# Patient Record
Sex: MALE | Race: WHITE | Employment: FULL TIME | URBAN - METROPOLITAN AREA
[De-identification: names, ages, dates, MRNs, and addresses within clinical notes are randomized per-mention and may not be internally consistent; named-entity substitution may affect disease eponyms.]

---

## 2019-12-17 ENCOUNTER — HOSPITAL ENCOUNTER (OUTPATIENT)
Dept: ULTRASOUND IMAGING | Age: 24
Discharge: HOME | End: 2019-12-17
Attending: FAMILY MEDICINE
Payer: COMMERCIAL

## 2019-12-17 DIAGNOSIS — N50.89 OTHER SPECIFIED DISORDERS OF THE MALE GENITAL ORGANS: ICD-10-CM

## 2019-12-17 PROCEDURE — 93975 VASCULAR STUDY: CPT

## 2021-07-19 ENCOUNTER — HOSPITAL ENCOUNTER (OUTPATIENT)
Dept: PHYSICAL THERAPY | Age: 26
Discharge: HOME | End: 2021-07-19
Payer: COMMERCIAL

## 2021-07-19 PROCEDURE — 97161 PT EVAL LOW COMPLEX 20 MIN: CPT

## 2021-07-19 PROCEDURE — 97110 THERAPEUTIC EXERCISES: CPT

## 2021-07-19 NOTE — PROGRESS NOTES
PT DAILY TREATMENT NOTE - [facility]     Patient Name: Bairon Brewster  Date:2021  : 1995  [x]  Patient  Verified  Payor: BLUE CROSS / Plan: Sime[facility] [id number] PPO / Product Type: PPO /    In time:345  Out time:430  Total Treatment Time (min): 45  Visit #: 1     Treatment Area: Left knee pain [M25.562]    SUBJECTIVE  Pain Level (0-10 scale): 1/10  Any medication changes, allergies to medications, adverse drug reactions, diagnosis change, or new procedure performed?: [x] No    [] Yes (see summary sheet for update)  Subjective functional status/changes:   [] No changes reported      MRI: 21  MEDIAL COMPARTMENT:     > Meniscus: Intact meniscus.     > Articular Cartilage: Cartilage remains intact, without surface irregularity, partial or full thickness defect.        LATERAL COMPARTMENT:    > Meniscus: Intact meniscus.     > Articular Cartilage: Cartilage remains intact, without surface irregularity, partial or full thickness defect.        CRUCIATE AND COLLATERAL LIGAMENTS: Intact, unremarkable.        EXTENSOR MECHANISM, PATELLOFEMORAL COMPARTMENT:    > Tendons, retinacula: Mild distal quadriceps and proximal patellar insertional tendinosis. Subtle edema is seen within the suprapatellar fat pad and the superolateral aspect of Hoffa's fat. There is no lateralization of the patellar tendon insertion on the tibial tuberosity, tibial tuberosity- trochlea groove distance measuring 11 mm. No trochlear dysplasia. No lateral patellar tilt or lateral patellar subluxation.    > Articular cartilage: Cartilage remains intact, without surface irregularity, partial or full thickness defect.        JOINT SPACE, SYNOVIUM: No joint effusion. No loose bodies. No Baker's cyst.        OSSEOUS: The osseous structures to include the patella are unremarkable.  There is no evidence for fracture or contusion.        REGIONAL SOFT TISSUES: Unremarkable.        OBJECTIVE     Skin ass  15 min []Eval []Re-Eval       25 min Therapeutic Exercise:  [] See flow sheet :   Rationale: increase ROM, increase strength, improve coordination and improve balance to improve the patients ability to ease with adl's            With   [] TE   [] TA   [] neuro   [] other: Patient Education: [x] Review HEP    [] Progressed/Changed HEP based on:   [] positioning   [] body mechanics   [] transfers   [] heat/ice application    [] other:      Other Objective/Functional Measures: see eval    Pain Level (0-10 scale) post treatment: 1/10    ASSESSMENT/Changes in Function: see poc    Patient will continue to benefit from skilled PT services to modify and progress therapeutic interventions, address functional mobility deficits, address ROM deficits, address strength deficits, analyze and address soft tissue restrictions, analyze and cue movement patterns, analyze and modify body mechanics/ergonomics, assess and modify postural abnormalities and address imbalance/dizziness to attain remaining goals.      [x]  See Plan of Care  []  See progress note/recertification  []  See Discharge Summary         Progress towards goals / Updated goals:  See poc    PLAN  [x]  Upgrade activities as tolerated     [x]  Continue plan of care  []  Update interventions per flow sheet       []  Discharge due to:_  []  Other:_      Kristen Oleary, PT 7/19/2021  9:42 AM    Future Appointments   Date Time Provider Angel Valle   7/19/2021  3:45 PM Za Dumont, PT [facility]

## 2021-07-19 NOTE — PROGRESS NOTES
[facility]  [facility]  [address] [place] [address]  [phone] fax    Plan of Care/ Statement of Necessity for Physical Therapy Services    Patient name: Ez Huston Start of Care: 2021   Referral source: Kandy Abarca MD : 1995    Medical Diagnosis: Left knee pain [M25.562]  Payor: [facility] / Plan: [facility] PPO / Product Type: PPO /  Onset Date:    Treatment Diagnosis: Left knee pain   Prior Hospitalization: see medical history Provider#: [id number]   Medications: Verified on Patient summary List    Comorbidities: None. 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 The Plan of Care and following information is based on the information from the initial evaluation. Assessment/ key information: Pt is a 31 yr old male with CC of left medial jt line and patella tendon pain. Pt reported an injury during a Basketball game in . Pt reports pain is 1/10 and is described as achy and tender to touch. Pt denies posterior knee pain. ROM is WNL w/o pain at end range in flexion and extension. An MRI was done and is negative for Meniscus/ACL injury. MMT H/S=4+/5, Quad=4+/5, Hip Abduction=4+/5 Pt presents with decreased hip flexibility. Most of his discomfort is during prolonged, standing, initiating standing up and stairs, running, cutting. Pt presents with a positive Valgus Test at 30 deg knee flexion. Negative for Meniscus and ACL. Pt is able to perform a SLR w/o quad lag. Pt will benefit from VMO strengthening, Stretching, and stabilization and core ex's to decrease knee pain and improve knee function.        Evaluation Complexity History LOW Complexity : Zero comorbidities / personal factors that will impact the outcome / POC; Examination LOW Complexity : 1-2 Standardized tests and measures addressing body structure, function, activity limitation and / or participation in recreation  ;Presentation LOW Complexity : Stable, uncomplicated  ;Clinical Decision Making MEDIUM Complexity : FOTO score of 26-74  Overall Complexity Rating: LOW   Problem List: pain affecting function, impaired gait/ balance, decrease ADL/ functional abilitiies, decrease activity tolerance, decrease flexibility/ joint mobility and decrease transfer abilities   Treatment Plan may include any combination of the following: Therapeutic exercise, Therapeutic activities, Neuromuscular re-education, Physical agent/modality, Gait/balance training, Manual therapy, Patient education, Self Care training, Functional mobility training, Home safety training and Stair training  Patient / Family readiness to learn indicated by: asking questions, trying to perform skills and interest  Persons(s) to be included in education: patient (P)  Barriers to Learning/Limitations: None  Patient Goal (s): Make my knee pain go away and make surrounding areas stable and flexible  Patient Self Reported Health Status: good  Rehabilitation Potential: good    Short Term Goals: To be accomplished in 1 weeks:   1. Pt will be independent with a basic home exercise program to take an active role in their rehabilitation process. Long Term Goals: To be accomplished in 5 weeks:   1. Pt will increase FOTO score by   pts to improve function. 2. 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 3. Pt will increase left quad and HS to 5/5 or greater to ease with improving stability to prevent further injury. 4. Pt will perform left SLS x 30 sec to be able to return to high level activity. 5. Patient will demonstrate a good understanding of their condition and strategies for self-management. Frequency / Duration: Patient to be seen 2 times per week for 5 weeks.     Patient/ CarPatient/ Caregiver education and instruction: Diagnosis, prognosis, activity modification, brace/ splint application and exercises   [x]  Plan of care has been reviewed with PTA    Carl Ann, PT 7/19/2021 12:12 PM    ________________________________________________________________________    I certify that the above Therapy Services are being furnished while the patient is under my care. I agree with the treatment plan and certify that this therapy is necessary.     Physician's Signature:____________Date:_________TIME:________     Abdulaziz Delgado MD  ** Signature, Date and Time must be completed for valid certification **    Please sign and return to [facility] [address]  [phone] fax

## 2021-07-27 ENCOUNTER — HOSPITAL ENCOUNTER (OUTPATIENT)
Dept: PHYSICAL THERAPY | Age: 26
Discharge: HOME | End: 2021-07-27
Payer: COMMERCIAL

## 2021-07-27 PROCEDURE — 97112 NEUROMUSCULAR REEDUCATION: CPT

## 2021-07-27 PROCEDURE — 97110 THERAPEUTIC EXERCISES: CPT

## 2021-07-27 PROCEDURE — 97140 MANUAL THERAPY 1/> REGIONS: CPT

## 2021-07-27 NOTE — PROGRESS NOTES
PT DAILY TREATMENT NOTE     Patient Name: Tony Rodriguez IV  Date:2021  : 1995  [x]  Patient  Verified  Payor: BLUE CROSS / Plan: [employer]imeon Ryan [id number] PPO / Product Type: PPO /    In time: 11:20A Out time: 12:02P  Total Treatment Time (min): 42  Visit #: 2 of     Medicare/BCBS Only   Total Timed Codes (min):  42 1:1 Treatment Time:  42       Treatment Area: Left knee pain [M25.562]    SUBJECTIVE  Pain Level (0-10 scale): 1  Any medication changes, allergies to medications, adverse drug reactions, diagnosis change, or new procedure performed?: [x] No    [] Yes (see summary sheet for update)  Subjective functional status/changes:   [] No changes reported  Patient reports compliance with prescribed HEP, w/ no issues to report . Reports diffciulty with ambulating extended durations secondary to increased knee discomfort    OBJECTIVE    20 min Therapeutic Exercise:  [x] See flow sheet :   Rationale: increase ROM and increase strength to improve the patients ability to perform ADLs with greater ease     12 min Neuromuscular Re-education:  [x]  See flow sheet :   Rationale: increase strength, improve coordination, improve balance and increase proprioception  to improve the patients ability to participate in recreational tasks with greater ease     10 min Manual Therapy:  STM/TPr to left distal quad, adductors and proximal gastroc in supine    The manual therapy interventions were performed at a separate and distinct time from the therapeutic activities interventions.   Rationale: decrease pain, increase tissue extensibility and decrease trigger points to improve tolerance to therex and return to PLOF           With   [x] TE   [] TA   [x] neuro   [] other: Patient Education: [x] Review HEP    [] Progressed/Changed HEP based on:   [] positioning   [] body mechanics   [] transfers   [] heat/ice application    [] other:       Pain Level (0-10 scale) post treatment: 0    ASSESSMENT/Changes in Function:   Initiated session w/ MT techniques listed above followed by introducing activities per POC . Minimal verbal cuing required in order to improve knee alignment during closed chain activities w/ pt demonstrating ability to self correct 100% of therapeutic opportunities after. Leaves session w/ report of muscular soreness, however no pain. Patient will continue to benefit from skilled PT services to modify and progress therapeutic interventions, address functional mobility deficits, address ROM deficits, address strength deficits, analyze and address soft tissue restrictions, analyze and cue movement patterns, analyze and modify body mechanics/ergonomics and assess and modify postural abnormalities to attain remaining goals. [x]  See Plan of Care  []  See progress note/recertification  []  See Discharge Summary         Progress towards goals / Updated goals:  Short Term Goals: To be accomplished in 1 weeks:               1. Pt will be independent with a basic home exercise program to take an active role in their rehabilitation process. Current: Goal Met: reports full compliance and demonstrates ind with prescribed HEP, 07/27/21     Long Term Goals: To be accomplished in 5 weeks:               1. Pt will increase FOTO score by   pts to improve function. 2. 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 3. Pt will increase left quad and HS to 5/5 or greater to ease with improving stability to prevent further injury. 4. Pt will perform left SLS x 30 sec to be able to return to high level activity. 5. Patient will demonstrate a good understanding of their condition and strategies for self-management.     PLAN  [x]  Upgrade activities as tolerated     [x]  Continue plan of care  []  Update interventions per flow sheet       []  Discharge due to:_  []  Other:_      Rhea Urias, DPT 7/27/2021  11:24 AM    Future Appointments   Date Time Provider Angel Valle   7/28/2021  7:30 AM Lalruddy Hudson, PT [facility]   8/2/2021  8:15 AM Lalruddy Hudson, PT [id number]   8/5/2021  9:00 AM Viky Gomran, PT [facility]   8/9/2021  9:00 AM Supriya Baugh, PTA [facility]   8/12/2021  8:15 AM Khai Hudson, PT [facility]   8/16/2021  8:15 AM Kahi Hudson, PT [facility]   8/19/2021  8:15 AM Khai Hudson, PT [facility]   8/23/2021  8:15 AM Khai Hudson, PT [facility]   8/26/2021  8:15 AM Khai Hudson, PT [facility]   8/30/2021  9:00 AM Supriya Dilkyrie, PTA [facility]   9/2/2021  8:15 AM Khai Hudson, PT [facility]

## 2021-07-28 ENCOUNTER — HOSPITAL ENCOUNTER (OUTPATIENT)
Dept: PHYSICAL THERAPY | Age: 26
Discharge: HOME | End: 2021-07-28
Payer: COMMERCIAL

## 2021-07-28 PROCEDURE — 97140 MANUAL THERAPY 1/> REGIONS: CPT

## 2021-07-28 PROCEDURE — 97110 THERAPEUTIC EXERCISES: CPT

## 2021-07-28 PROCEDURE — 97112 NEUROMUSCULAR REEDUCATION: CPT

## 2021-07-28 NOTE — PROGRESS NOTES
PT DAILY TREATMENT NOTE     Patient Name: Theresa Mcintosh IV  Date:2021  : 1995  [x]  Patient  Verified  Payor: BLUE CROSS / Plan: Simeon Ryan [id number] PPO / Product Type: PPO /    In time: 7:32 Out time: 8:25  Total Treatment Time (min): 48  Visit #: 3 of     Medicare/BCBS Only   Total Timed Codes (min):  43 1:1 Treatment Time:  43       Treatment Area: Left knee pain [M25.562]    SUBJECTIVE  Pain Level (0-10 scale): 1/10  Any medication changes, allergies to medications, adverse drug reactions, diagnosis change, or new procedure performed?: [x] No    [] Yes (see summary sheet for update)  Subjective functional status/changes:   [] No changes reported    Pt reports his knee feels good but is sore form last session. OBJECTIVE    25 min Therapeutic Exercise:  [x] See flow sheet :   Rationale: increase ROM and increase strength to improve the patients ability to perform ADLs with ease    8 min Neuromuscular Re-education:  [x]  See flow sheet :   Rationale: increase strength, improve coordination, improve balance and increase proprioception  to improve the patients ability to perform functional tasks with decreased pain    10 min Manual Therapy:  STM/TPr to left distal quads and proximal gastroc in supine    The manual therapy interventions were performed at a separate and distinct time from the therapeutic activities interventions.   Rationale: decrease pain, increase tissue extensibility and decrease trigger points to improve tolerance to ADLs    Ice: 10' left knee in supine             With   [x] TE   [] TA   [] neuro   [] other: Patient Education: [x] Review HEP    [] Progressed/Changed HEP based on:   [] positioning   [] body mechanics   [] transfers   [] heat/ice application    [] other:      Other Objective/Functional Measures:    Increased reps on SLR exercise   Required cuing to prevent bringing leg forward during SLR abduction  Added prone quad stretch      Pain Level (0-10 scale) post treatment: 0/10      ASSESSMENT/Changes in Function:   Pt is progressing well with therapy. He demonstrates good awareness of proper form during exercises which required minimal cuing for correction. Pt was most challenged with abd and reverse sliders and ecc lowers with the 6\" box secondary to weakness of lower extremity. Will continue to address strength deficits in lower extremity to decrease pain and improve ease of ADLs. Patient will continue to benefit from skilled PT services to modify and progress therapeutic interventions, address functional mobility deficits, address ROM deficits, address strength deficits, analyze and address soft tissue restrictions, analyze and cue movement patterns, analyze and modify body mechanics/ergonomics and assess and modify postural abnormalities to attain remaining goals. [x]  See Plan of Care  []  See progress note/recertification  []  See Discharge Summary         Progress towards goals / Updated goals:  Short Term Goals: To be accomplished in 1 weeks:               1. Pt will be independent with a basic home exercise program to take an active role in their rehabilitation process. Current: Goal Met: reports full compliance and demonstrates ind with prescribed HEP, 07/27/21     Long Term Goals: To be accomplished in 5 weeks:               1. Pt will increase FOTO score by   pts to improve function. 2. 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 3. Pt will increase left quad and HS to 5/5 or greater to ease with improving stability to prevent further injury. 4. Pt will perform left SLS x 30 sec to be able to return to high level activity. 5. Patient will demonstrate a good understanding of their condition and strategies for self-management.     PLAN  [x]  Upgrade activities as tolerated     [x]  Continue plan of care  []  Update interventions per flow sheet       []  Discharge due to:_  []  Other:_      Braden Carmelo, SPT 7/28/2021  11:24 AM    I was present during the entire treatment, directing and participating in the treatment.    Scar Haynes DPT      Future Appointments   Date Time Provider Angel Valle   7/28/2021  7:30 AM Lee Los, PT [facility]   8/2/2021  8:15 AM Lee Los, PT [facility]   8/5/2021  9:00 AM Jb Gorman, PT [facility]   8/9/2021  9:00 AM Ailsia Jesus, PTA [facility]   8/12/2021  8:15 AM Lee Los, PT [facility]   8/16/2021  8:15 AM Lee Los, PT [facility]   8/19/2021  8:15 AM Lee Los, PT [facility]   8/23/2021  8:15 AM Lee Los, PT [facility]   8/26/2021  8:15 AM Lee Los, PT [facility]   8/30/2021  9:00 AM Alisia Jesus, PTA [facility]   9/2/2021  8:15 AM Lee Los, PT [facility]

## 2021-08-02 ENCOUNTER — HOSPITAL ENCOUNTER (OUTPATIENT)
Dept: PHYSICAL THERAPY | Age: 26
Discharge: HOME | End: 2021-08-02
Payer: COMMERCIAL

## 2021-08-02 PROCEDURE — 97140 MANUAL THERAPY 1/> REGIONS: CPT

## 2021-08-02 PROCEDURE — 97110 THERAPEUTIC EXERCISES: CPT

## 2021-08-05 ENCOUNTER — HOSPITAL ENCOUNTER (OUTPATIENT)
Dept: PHYSICAL THERAPY | Age: 26
Discharge: HOME | End: 2021-08-05
Payer: COMMERCIAL

## 2021-08-05 PROCEDURE — 97140 MANUAL THERAPY 1/> REGIONS: CPT

## 2021-08-05 PROCEDURE — 97110 THERAPEUTIC EXERCISES: CPT

## 2021-08-05 NOTE — PROGRESS NOTES
PT DAILY TREATMENT NOTE     Patient Name: Mason Godinez IV  Date:2021  : 1995  [x]  Patient  Verified  Payor: BLUE CROSS / Plan: [facility] PPO / Product Type: PPO /    In time:9:00  Out time:9:52  Total Treatment Time (min): 52  Visit #: 5 of     Medicare/BCBS Only   Total Timed Codes (min):  42 1:1 Treatment Time:  40       Treatment Area: Left knee pain [M25.562]    SUBJECTIVE  Pain Level (0-10 scale): 2/10  Any medication changes, allergies to medications, adverse drug reactions, diagnosis change, or new procedure performed?: [x] No    [] Yes (see summary sheet for update)  Subjective functional status/changes:   [] No changes reported  Pt reports that he has been trying to stretch and ice more, and the pain has been better. He has more tightness in the mornings. He has been working on the medial patellar glides at home with some discomfort. He has the most discomfort with prolonged activity/weightbearing.       OBJECTIVE    Modality rationale: decrease inflammation and decrease pain to improve the patients ability to tolerate daily tasks    Min Type Additional Details    [] Estim:  []Unatt       []IFC  []Premod                        []Other:  []w/ice   []w/heat  Position:  Location:    [] Estim: []Att    []TENS instruct  []NMES                    []Other:  []w/US   []w/ice   []w/heat  Position:  Location:    []  Traction: [] Cervical       []Lumbar                       [] Prone          []Supine                       []Intermittent   []Continuous Lbs:  [] before manual  [] after manual    []  Ultrasound: []Continuous   [] Pulsed                           []1MHz   []3MHz W/cm2:  Location:    []  Iontophoresis with dexamethasone         Location: [] Take home patch   [] In clinic   10 [x]  Ice     []  heat  []  Ice massage  []  Laser   []  Anodyne Position: supine  Location: left knee     []  Laser with stim  []  Other:  Position:  Location:    []  Vasopneumatic Device []  Right     []  Left  Pre-treatment girth:  Post-treatment girth:  Measured at (location):  Pressure:       [] lo [] med [] hi   Temperature: [] lo [] med [] hi   [x] Skin assessment post-treatment:  [x]intact []redness- no adverse reaction    []redness  adverse reaction:       32 min Therapeutic Exercise:  [x] See flow sheet :   Rationale: increase ROM and increase strength to improve the patients ability to improve ease       10 min Manual Therapy:  Medial patellar glides, TPR distal quads, semimembranosus, biceps femoris    The manual therapy interventions were performed at a separate and distinct time from the therapeutic activities interventions. Rationale: decrease pain, increase ROM, increase tissue extensibility and decrease trigger points to improve ease of prolonged standing/ambulation          With   [] TE   [] TA   [] neuro   [] other: Patient Education: [x] Review HEP    [] Progressed/Changed HEP based on:   [] positioning   [] body mechanics   [] transfers   [] heat/ice application    [] other:      Other Objective/Functional Measures:     No pain with initiation of elliptical   Subjective information obtained during elliptical    Continued trigger points in distal quad/hamstring musculature, addressed manually  Improving medial patellar mobility    Valgus collapse with split squats, improved with verbal cuing. Added blue TB with lateral resist to challenge avoiding valgus with split squat     Challenged with initiation of VMO      Challenged with avoiding valgus with final reps of eccentric step down from 8\" step     Pain Level (0-10 scale) post treatment: 1-2/10    ASSESSMENT/Changes in Function:     Pt is making slow, steady progress toward updated goals in therapy. He continues to present with valgus collapse with loaded knee flexion but is able to reduce with cuing. Decreased VMO strength is evident.   Will continue to address strength, flexibility, and kinetic chain alignment deficits in order to reduce pain with daily tasks and allow for return to PLOF. Patient will continue to benefit from skilled PT services to modify and progress therapeutic interventions, address functional mobility deficits, address ROM deficits, address strength deficits, analyze and address soft tissue restrictions, analyze and cue movement patterns, analyze and modify body mechanics/ergonomics, assess and modify postural abnormalities and instruct in home and community integration to attain remaining goals. Progress towards goals / Updated goals:  Short Term Goals: To be accomplished in 1 weeks:               1. Pt will be independent with a basic home exercise program to take an active role in their rehabilitation process.                Current: Goal Met: reports full compliance and demonstrates ind with prescribed HEP, 07/27/21     Long Term Goals: To be accomplished in 5 weeks:               1. Pt will increase FOTO score by   pts to improve function.                2. 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 Progressing. Pt reports 35% improvement 8/5/21               3. Pt will increase left quad and HS to 5/5 or greater to ease with improving stability to prevent further injury.              4. Pt will perform left SLS x 30 sec to be able to return to high level activity. Met (8/2/21)               5. Patient will demonstrate a good understanding of their condition and strategies for self-management.     PLAN  [x]  Upgrade activities as tolerated     [x]  Continue plan of care  []  Update interventions per flow sheet       []  Discharge due to:_  []  Other:_      Donita Moon, JENNY 8/5/2021  9:04 AM    Future Appointments   Date Time Provider Angel Valle   8/9/2021  9:00 AM Juan Carlos Cline, PTA [facility]   8/12/2021  8:15 AM Sophia Flavors, PT [facility]   8/16/2021  8:15 AM Sophia Flavors, PT [facility]   8/19/2021 8:15 AM Clista Prime, PT [facility]   8/23/2021  8:15 AM Clista Prime, PT [facility]   8/26/2021  8:15 AM Clista Prime, PT [facility]   8/30/2021  9:00 AM Lisa Manzano, PTA [facility]   9/2/2021  8:15 AM Clista Prime, PT [facility]

## 2021-08-09 ENCOUNTER — HOSPITAL ENCOUNTER (OUTPATIENT)
Dept: PHYSICAL THERAPY | Age: 26
Discharge: HOME | End: 2021-08-09
Payer: COMMERCIAL

## 2021-08-09 PROCEDURE — 97110 THERAPEUTIC EXERCISES: CPT

## 2021-08-09 NOTE — PROGRESS NOTES
PT DAILY TREATMENT NOTE     Patient Name: Gee Nettles IV  Date:2021  : 1995  [x]  Patient  Verified  Payor: BLUE CROSS / Plan: [facility] PPO / Product Type: PPO /    In time: 9:00  Out time: 9:59  Total Treatment Time (min):59  Visit #: 6 of     Medicare/BCBS Only   Total Timed Codes (min): 49 1:1 Treatment Time:49       Treatment Area: Left knee pain [M25.562]    SUBJECTIVE  Pain Level (0-10 scale): 1-2/10   Any medication changes, allergies to medications, adverse drug reactions, diagnosis change, or new procedure performed?: [x] No    [] Yes (see summary sheet for update)  Subjective functional status/changes:   [] No changes reported  Pt reports same inner knee pain today. He states doing his exercises and using the massager on his leg. He hasn't been able to return to sports yet.      OBJECTIVE    Modality rationale: decrease inflammation and decrease pain to improve the patients ability to tolerate daily tasks    Min Type Additional Details    [] Estim:  []Unatt       []IFC  []Premod                        []Other:  []w/ice   []w/heat  Position:  Location:    [] Estim: []Att    []TENS instruct  []NMES                    []Other:  []w/US   []w/ice   []w/heat  Position:  Location:    []  Traction: [] Cervical       []Lumbar                       [] Prone          []Supine                       []Intermittent   []Continuous Lbs:  [] before manual  [] after manual    []  Ultrasound: []Continuous   [] Pulsed                           []1MHz   []3MHz W/cm2:  Location:    []  Iontophoresis with dexamethasone         Location: [] Take home patch   [] In clinic   10 [x]  Ice     []  heat  []  Ice massage  []  Laser   []  Anodyne Position: supine  Location: left knee     []  Laser with stim  []  Other:  Position:  Location:    []  Vasopneumatic Device    []  Right     []  Left  Pre-treatment girth:  Post-treatment girth:  Measured at (location):  Pressure:       [] lo [] med [] hi   Temperature: [] lo [] med [] hi   [x] Skin assessment post-treatment:  [x]intact []redness- no adverse reaction    []redness  adverse reaction:       47 min Therapeutic Exercise:  [x] See flow sheet :   Rationale: increase ROM and increase strength to improve the patients ability to improve ease       2 min Manual Therapy:  Leg lengthening for right upslip; MET for left PI; shotgun technique   The manual therapy interventions were performed at a separate and distinct time from the therapeutic activities interventions. Rationale: decrease pain, increase ROM, increase tissue extensibility and decrease trigger points to improve ease of prolonged standing/ambulation          With   [] TE   [] TA   [] neuro   [] other: Patient Education: [x] Review HEP    [] Progressed/Changed HEP based on:   [] positioning   [] body mechanics   [] transfers   [] heat/ice application    [] other:      Other Objective/Functional Measures:  Pt with reports of decreased knee pain post quick alignment correction at start of session  VMO atrophy noted  Compensations with glute abduction with TFL; cues to correct for better form  Added bands for all glute strengthening for decreased valgus collapse  Progress planks/side planks with glute activation  Added pes anserine strengthening with TB    Pain Level (0-10 scale) post treatment: 0/10    ASSESSMENT/Changes in Function: Pt with VMO atrophy and weakness of the glutes needed to progress left knee stability for sports. He has continued valgus collapse with closed chain exercises. Will continue to progress endurance of the hips, knee and core for progression back into sports with decreased pain and chance of reinjury.       Patient will continue to benefit from skilled PT services to modify and progress therapeutic interventions, address functional mobility deficits, address ROM deficits, address strength deficits, analyze and address soft tissue restrictions, analyze and cue movement patterns, analyze and modify body mechanics/ergonomics, assess and modify postural abnormalities and instruct in home and community integration to attain remaining goals. Progress towards goals / Updated goals:  Short Term Goals: To be accomplished in 1 weeks:               1. Pt will be independent with a basic home exercise program to take an active role in their rehabilitation process.                Current: Goal Met: reports full compliance and demonstrates ind with prescribed HEP, 07/27/21     Long Term Goals: To be accomplished in 5 weeks:               1. Pt will increase FOTO score by   pts to improve function.                2. 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 Progressing. Pt reports 35% improvement 8/5/21               3. Pt will increase left quad and HS to 5/5 or greater to ease with improving stability to prevent further injury.              4. Pt will perform left SLS x 30 sec to be able to return to high level activity. Met (8/2/21)               5. Patient will demonstrate a good understanding of their condition and strategies for self-management.     PLAN  [x]  Upgrade activities as tolerated     [x]  Continue plan of care  []  Update interventions per flow sheet       []  Discharge due to:_  []  Other:_      Hector Santiago, BERNA 8/9/2021  9:04 AM    Future Appointments   Date Time Provider Angel Valle   8/9/2021  9:00 AM [facility], BERNA [facility]   8/12/2021  8:15 AM Tiffani December, PT [facility]   8/16/2021  8:15 AM Tiffani December, PT [facility]   8/19/2021  8:15 AM Tiffani December, PT [facility]   8/23/2021  8:15 AM [facility] December, [facility]   8/26/2021  8:15 AM Tiffani December, PT [facility]   8/30/2021  9:00 AM [facility], PTA [facility]   9/2/2021  8:15 AM Tiffani Sorto, PT [facility]

## 2021-08-12 ENCOUNTER — HOSPITAL ENCOUNTER (OUTPATIENT)
Dept: PHYSICAL THERAPY | Age: 26
Discharge: HOME | End: 2021-08-12
Payer: COMMERCIAL

## 2021-08-12 PROCEDURE — 97110 THERAPEUTIC EXERCISES: CPT

## 2021-08-12 PROCEDURE — 97140 MANUAL THERAPY 1/> REGIONS: CPT

## 2021-08-12 NOTE — PROGRESS NOTES
PT DAILY TREATMENT NOTE     Patient Name: Jim Gutierres IV  Date:2021  : 1995  [x]  Patient  Verified  Payor: BLUE CROSS / Plan: [id number] PPO / Product Type: PPO /    In time: 8:20  Out time: 9:08  Total Treatment Time (min): 48  Visit #: 7 of     Medicare/BCBS Only   Total Timed Codes (min):  38 1:1 Treatment Time:  38       Treatment Area: Left knee pain [M25.562]    SUBJECTIVE  Pain Level (0-10 scale):  2-3/10  Any medication changes, allergies to medications, adverse drug reactions, diagnosis change, or new procedure performed?: [x] No    [] Yes (see summary sheet for update)  Subjective functional status/changes:   [] No changes reported  Pt. Reports he is doing pretty good today. He continues to have pain on inside of his knee. OBJECTIVE    30 min Therapeutic Exercise:  [x] See flow sheet :   Rationale: increase ROM and increase strength to improve the patients ability to increase ease of ADLs     8 min Manual Therapy:  Medial patella mobs, trigger point release to distal medial quads   The manual therapy interventions were performed at a separate and distinct time from the therapeutic activities interventions. Rationale: decrease pain, increase ROM and increase tissue extensibility to increase ease of ADLs          With   [x] TE   [] TA   [] neuro   [] other: Patient Education: [x] Review HEP    [] Progressed/Changed HEP based on:   [] positioning   [] body mechanics   [] transfers   [] heat/ice application    [] other:      Other Objective/Functional Measures:   MMT left knee ext: 4+/5 flex: 5/5   Decreased hip ER strength compared to right side. Challenged with 20# resistance during LAQ    Pain Level (0-10 scale) post treatment:  0/10    ASSESSMENT/Changes in Function:  Pt. Is progressing slowly towards goals. He continues to have decreased left quad strength and decreased left hip strength which is contributing to his symptoms.  His patella mobility is gradually improving but he continues to have trigger points in distal quads. Patient will continue to benefit from skilled PT services to modify and progress therapeutic interventions, address functional mobility deficits, address ROM deficits, address strength deficits, analyze and address soft tissue restrictions, analyze and cue movement patterns, analyze and modify body mechanics/ergonomics and assess and modify postural abnormalities to attain remaining goals. Progress towards goals / Updated goals:  Short Term Goals: To be accomplished in 1 weeks:               1. Pt will be independent with a basic home exercise program to take an active role in their rehabilitation process.                Current: Goal Met: reports full compliance and demonstrates ind with prescribed HEP, 07/27/21     Long Term Goals: To be accomplished in 5 weeks:               1. Pt will increase FOTO score by   pts to improve function.                2. 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 Progressing. Pt reports 35% improvement 8/5/21               3. Pt will increase left quad and HS to 5/5 or greater to ease with improving stability to prevent further injury.    Progressing: ext: 4+/5 flex: 5/5 (8/12/21)               4. Pt will perform left SLS x 30 sec to be able to return to high level activity.   Met (8/2/21)               5. Patient will demonstrate a good understanding of their condition and strategies for self-management.       PLAN  []  Upgrade activities as tolerated     [x]  Continue plan of care  []  Update interventions per flow sheet       []  Discharge due to:_  []  Other:_      Jettie Divers, PT 8/12/2021  7:40 AM    Future Appointments   Date Time Provider Angel Valle   8/12/2021  8:15 AM Ld Peterson PT [facility]   8/16/2021  8:15 AM Ld Peterson PT [facility]   8/19/2021  8:15 AM Ld Peterson PT [facility] 8/23/2021  8:15 AM Dante Doyle, PT [facility]   8/26/2021  8:15 AM Dante Doyle, PT [facility]   8/30/2021  9:00 AM Hardy Butler, PTA [facility]   9/2/2021  8:15 AM Dante Doyle, PT [facility]

## 2021-08-16 ENCOUNTER — HOSPITAL ENCOUNTER (OUTPATIENT)
Dept: PHYSICAL THERAPY | Age: 26
Discharge: HOME | End: 2021-08-16
Payer: COMMERCIAL

## 2021-08-16 PROCEDURE — 97110 THERAPEUTIC EXERCISES: CPT

## 2021-08-16 PROCEDURE — 97140 MANUAL THERAPY 1/> REGIONS: CPT

## 2021-08-16 NOTE — PROGRESS NOTES
PT DAILY TREATMENT NOTE     Patient Name: Ez Huston IV  Date:2021  : 1995  [x]  Patient  Verified  Payor: BLUE CROSS / Plan: [id number] PPO / Product Type: PPO /    In time: 8:18  Out time: 9:12  Total Treatment Time (min): 54  Visit #: 8 of     Medicare/BCBS Only   Total Timed Codes (min):  44 1:1 Treatment Time:  40       Treatment Area: Left knee pain [M25.562]    SUBJECTIVE  Pain Level (0-10 scale):  1/10  Any medication changes, allergies to medications, adverse drug reactions, diagnosis change, or new procedure performed?: [x] No    [] Yes (see summary sheet for update)  Subjective functional status/changes:   [] No changes reported  Pt. Reports he did pretty good over the weekend.  He continues to have some pain     OBJECTIVE    Modality rationale: decrease inflammation and decrease pain to improve the patients ability to increase ease of ADLs   Min Type Additional Details    [] Estim:  []Unatt       []IFC  []Premod                        []Other:  []w/ice   []w/heat  Position:  Location:    [] Estim: []Att    []TENS instruct  []NMES                    []Other:  []w/US   []w/ice   []w/heat  Position:  Location:    []  Traction: [] Cervical       []Lumbar                       [] Prone          []Supine                       []Intermittent   []Continuous Lbs:  [] before manual  [] after manual    []  Ultrasound: []Continuous   [] Pulsed                           []1MHz   []3MHz W/cm2:  Location:    []  Iontophoresis with dexamethasone         Location: [] Take home patch   [] In clinic   10 [x]  Ice     []  heat  []  Ice massage  []  Laser   []  Anodyne Position: supine  Location: left knee    []  Laser with stim  []  Other:  Position:  Location:    []  Vasopneumatic Device    []  Right     []  Left  Pre-treatment girth:  Post-treatment girth:  Measured at (location):  Pressure:       [] lo [] med [] hi   Temperature: [] lo [] med [] hi   [x] Skin assessment post-treatment:  [x]intact []redness- no adverse reaction    []redness  adverse reaction:     36 min Therapeutic Exercise:  [x] See flow sheet :   Rationale: increase ROM and increase strength to improve the patients ability to increase ease of ADLs    8 min Manual Therapy:  Medial patella mobs, trigger point release to distal quads, KT medial patella   The manual therapy interventions were performed at a separate and distinct time from the therapeutic activities interventions. Rationale: decrease pain, increase ROM and increase tissue extensibility to increase ease of ADLs          With   [x] TE   [] TA   [] neuro   [] other: Patient Education: [x] Review HEP    [] Progressed/Changed HEP based on:   [] positioning   [] body mechanics   [] transfers   [] heat/ice application    [] other:      Other Objective/Functional Measures:   Pt. Continues to be challenged with 1# resistance during SAQ with ball squeeze  Required breaks during side planks with hip abduction   Continues to have patella hypomobility medially      Pain Level (0-10 scale) post treatment:  0/10    ASSESSMENT/Changes in Function:  Pt. Is progressing slowly towards goals. He is having less pain overall but continues to have increased pain if doing more activities. He continues to demonstrate decreased knee extension strength and decreased hip strength. Patient will continue to benefit from skilled PT services to modify and progress therapeutic interventions, address functional mobility deficits, address ROM deficits, address strength deficits, analyze and address soft tissue restrictions, analyze and cue movement patterns, analyze and modify body mechanics/ergonomics and assess and modify postural abnormalities to attain remaining goals. Progress towards goals / Updated goals:  Short Term Goals: To be accomplished in 1 weeks:               1.  Pt will be independent with a basic home exercise program to take an active role in their rehabilitation process.                Current: Goal Met: reports full compliance and demonstrates ind with prescribed HEP, 07/27/21     Long Term Goals: To be accomplished in 5 weeks:               1. Pt will increase FOTO score by   pts to improve function.                2. 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  Progressing.  Pt reports 35% improvement 8/5/21               3. Pt will increase left quad and HS to 5/5 or greater to ease with improving stability to prevent further injury. Progressing: ext: 4+/5 flex: 5/5 (8/12/21)               4. Pt will perform left SLS x 30 sec to be able to return to high level activity.   Met (8/2/21)               5. Patient will demonstrate a good understanding of their condition and strategies for self-management.     PLAN  []  Upgrade activities as tolerated     [x]  Continue plan of care  []  Update interventions per flow sheet       []  Discharge due to:_  []  Other:_      David Perry, PT 8/16/2021  7:50 AM    Future Appointments   Date Time Provider Angel Valle   8/16/2021  8:15 AM Chun Jose Angel, PT [facility]   8/19/2021  8:15 AM Chun Jose Angel, PT [facility]   8/23/2021  8:15 AM Chun Jose Angel, PT [facility]   8/26/2021  8:15 AM Chun Jose Angel, [facility]   8/30/2021  9:00 AM Nick Yoon, PTA [facility]   9/2/2021  8:15 AM Chun Jose Angel, PT [facility]

## 2021-08-19 ENCOUNTER — HOSPITAL ENCOUNTER (OUTPATIENT)
Dept: PHYSICAL THERAPY | Age: 26
Discharge: HOME | End: 2021-08-19
Payer: COMMERCIAL

## 2021-08-19 PROCEDURE — 97110 THERAPEUTIC EXERCISES: CPT

## 2021-08-19 NOTE — PROGRESS NOTES
[facility] Client  [address] [facility]  [phone] fax    Physical Therapy Progress Note  Patient name: Sneha Gaytan Start of Care: 2021   Referral source: Jaz High MD : 1995                Medical Diagnosis: Left knee pain [M25.562]  Payor: [facility] / Plan: [facility] PPO / Product Type: PPO /  Onset Date:                Treatment Diagnosis: Left knee pain   Prior Hospitalization: see medical history Provider#: [id number]   Medications: Verified on Patient summary List    Comorbidities: None. 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 Visits from Start of Care: 9    Missed Visits: 0    Established Goals:         Excellent           Good         Limited           None  [x] Increased ROM   []  [x]  []  []  [x] Increased Strength  []  [x]  []  []  [x] Increased Mobility  []  [x]  []  []   [] Decreased Pain   []  []  []  []  [] Decreased Swelling  []  []  []  []    Key Functional Changes:  Pt. Is progressing slowly towards goals. He reports a 60% improvement in symptoms since [facility] despite reports of 60% improvement in symptoms since [facility]. He continues to have most difficulty with squatting and prolonged walking. He has some pain and genu valgus with 10 8 inch eccentric step downs. Left knee MMT improved ext: 5/5 flex: 5/5. He continues to demonstrate decreased left hip IR/ER strength at 4+/5. Patella hypomobility going medially and pt. Has decreased pain with taping to prevent lateral patella tracking. Skilled PT is medically necessary in order to continue to improve left knee and hip strength for increased ease of working and return to [facility]. Updated Goals: to be achieved in 4 weeks:  1. Patient will improve FOTO score by 9 points in order to demonstrate a significant improvement in function.    2. Patient will report an 85% improvement in left knee symptoms since [facility] in order to increase ease of working. 3. Patient will improve left hip IR/ER strength to 5/5 in order to increase ease of cutting with running. 4. Patient will perform 10 eccentric step downs from 8 inch block without genu valgus and no increase in pain order to increase ease of stair negotiation. ASSESSMENT/RECOMMENDATIONS:  [x]Continue therapy per initial plan/protocol at a frequency of  2 x per week for 4 weeks  []Continue therapy with the following recommended changes:_____________________      _____________________________________________________________________  []Discontinue therapy progressing towards or have reached established goals  []Discontinue therapy due to lack of appreciable progress towards goals  []Discontinue therapy due to lack of attendance or compliance  []Await Physician's recommendations/decisions regarding therapy  []Other:________________________________________________________________    Thank you for this referral.   Rock Velasquez, PT 8/19/2021 8:31 AM    NOTE TO PHYSICIAN:  Shabnam De 172   [facility] TO [facility]: [phone]  If you are unable to process this request in 24 hours please contact our office: [phone] I have read the above report and request that my patient continue as recommended. ? I have read the above report and request that my patient continue therapy with the following changes/special instructions:____________________________________  ? I have read the above report and request that my patient be discharged from therapy.     Physicians signature: ______________________________Date: ______Time:______     Cordelia Mills MD

## 2021-08-19 NOTE — PROGRESS NOTES
PT DAILY TREATMENT NOTE     Patient Name: Genevieve Benson IV  Date:2021  : 1995  [x]  Patient  Verified  Payor: [employer] / Plan: Simeon Ryan [id number] PPO / Product Type: PPO /    In time: 8:20  Out time: 9:00  Total Treatment Time (min): 40  Visit #: 9 of     Medicare/BCBS Only   Total Timed Codes (min):  40 1:1 Treatment Time:  40       Treatment Area: Left knee pain [M25.562]    SUBJECTIVE  Pain Level (0-10 scale):  1/10  Any medication changes, allergies to medications, adverse drug reactions, diagnosis change, or new procedure performed?: [x] No    [] Yes (see summary sheet for update)  Subjective functional status/changes:   [] No changes reported  Pt. Reports he is having less pain overall. He continues to be concerned with cutting and jumping for return to sport. He reports less pain with tape. OBJECTIVE    40 min Therapeutic Exercise:  [x] See flow sheet :   Rationale: increase ROM and increase strength to improve the patients ability to increase ease of working           With   [x] TE   [] TA   [] neuro   [] other: Patient Education: [x] Review HEP    [] Progressed/Changed HEP based on:   [] positioning   [] body mechanics   [] transfers   [] heat/ice application    [] other:      Other Objective/Functional Measures:    FOTO: 75  % improvement: 60%  Left knee MMT ext: 5/5 flex: 5/5  Left hip IR: 4+/5 ER: 4+/5   8 inch eccentric step downs: genu valgus with soreness    Pain Level (0-10 scale) post treatment:  1/10    ASSESSMENT/Changes in Function:      []  See Plan of Care  [x]  See progress note/recertification  []  See Discharge Summary         Progress towards goals / Updated goals:  See progress note    PLAN  []  Upgrade activities as tolerated     [x]  Continue plan of care  []  Update interventions per flow sheet       []  Discharge due to:_  []  Other:_      Marcos Munson, PT 2021  7:49 AM    Future Appointments   Date Time Provider Angel Valle 8/19/2021  8:15 AM Lee Los, PT [facility]   8/23/2021  8:15 AM Lee Los, PT [facility]   8/26/2021  8:15 AM Lee Los, PT [facility]   8/30/2021  9:00 AM Alisia Jesus, PTA [facility]   9/2/2021  8:15 AM Lee Los, PT [facility]

## 2021-08-23 ENCOUNTER — HOSPITAL ENCOUNTER (OUTPATIENT)
Dept: PHYSICAL THERAPY | Age: 26
Discharge: HOME | End: 2021-08-23
Payer: COMMERCIAL

## 2021-08-23 PROCEDURE — 97110 THERAPEUTIC EXERCISES: CPT

## 2021-08-23 NOTE — PROGRESS NOTES
PT DAILY TREATMENT NOTE     Patient Name: Ivis Puente IV  Date:2021  : 1995  [x]  Patient  Verified  Payor: [employer] / Plan: Simeon Ryan [id number] PPO / Product Type: PPO /    In time: 8:20  Out time: 9:05  Total Treatment Time (min): 45  Visit #: 1 of 8    Medicare/BCBS Only   Total Timed Codes (min):  45 1:1 Treatment Time:  45       Treatment Area: Left knee pain [M25.562]    SUBJECTIVE  Pain Level (0-10 scale): 1/10  Any medication changes, allergies to medications, adverse drug reactions, diagnosis change, or new procedure performed?: [x] No    [] Yes (see summary sheet for update)  Subjective functional status/changes:   [] No changes reported  Pt. Reports he is doing pretty good today. No problems with his knee over the weekend    OBJECTIVE    40 min Therapeutic Exercise:  [x] See flow sheet :   Rationale: increase ROM and increase strength to improve the patients ability to increase ease of ADLs    5 min: manual: KT to facilitate medial patella tracking  Rationale: to decrease pain for increased ease of working          With   [x] TE   [] TA   [] neuro   [] other: Patient Education: [x] Review HEP    [] Progressed/Changed HEP based on:   [] positioning   [] body mechanics   [] transfers   [] heat/ice application    [] other:      Other Objective/Functional Measures:   Pt. Tolerated PT well with no reports of increased pain  No pain with single leg side to side hop in ladder  Genu valgus at times with curtsy lunges     Pain Level (0-10 scale) post treatment: 1/10    ASSESSMENT/Changes in Function:  Pt. Is progressing slowly towards goals. He continues to demonstrate decreased B hip strength and decreased knee extension strength on left. He is having less pain overall but is still unable to participate in sport activities.      Patient will continue to benefit from skilled PT services to modify and progress therapeutic interventions, address functional mobility deficits, address ROM deficits, address strength deficits, analyze and address soft tissue restrictions, analyze and cue movement patterns, analyze and modify body mechanics/ergonomics and assess and modify postural abnormalities to attain remaining goals. Progress towards goals / Updated goals:  1. Patient will improve FOTO score by 9 points in order to demonstrate a significant improvement in function. 2. Patient will report an 85% improvement in left knee symptoms since [facility] in order to increase ease of working. 3. Patient will improve left hip IR/ER strength to 5/5 in order to increase ease of cutting with running. 4. Patient will perform 10 eccentric step downs from 8 inch block without genu valgus and no increase in pain order to increase ease of stair negotiation.      PLAN  []  Upgrade activities as tolerated     [x]  Continue plan of care  []  Update interventions per flow sheet       []  Discharge due to:_  []  Other:_      Analia Munson, PT 8/23/2021  7:44 AM    Future Appointments   Date Time Provider Angel Valle   8/23/2021  8:15 AM Sherlene Centers, PT [facility]   8/26/2021  8:15 AM Sherlene Centers, PT [facility]   8/30/2021  9:00 AM Sandy Hock, PTA [facility]   9/2/2021  8:15 AM Sherlene Centers, PT [facility]   9/7/2021 11:15 AM Christina Casanova, PT [facility]   9/9/2021  9:30 AM Sherlene Centers, PT [facility]   9/13/2021  8:15 AM Sandy Hock, PTA [facility]   9/16/2021  7:30 AM Fely Duncan, PTA [facility]   9/20/2021  8:15 AM Sandy Hock, PTA [facility]   9/23/2021  8:15 AM Sherlene Centers, PT [facility]   9/27/2021  8:15 AM Sandy Hock, PTA [facility]   9/30/2021  9:00 AM [facility]   10/4/2021  8:15 AM [facility], PT [facility]   10/7/2021  8:15 AM [facility], PT [facility]

## 2021-08-26 ENCOUNTER — HOSPITAL ENCOUNTER (OUTPATIENT)
Dept: PHYSICAL THERAPY | Age: 26
Discharge: HOME | End: 2021-08-26
Payer: COMMERCIAL

## 2021-08-26 PROCEDURE — 97110 THERAPEUTIC EXERCISES: CPT

## 2021-08-26 NOTE — PROGRESS NOTES
PT DAILY TREATMENT NOTE     Patient Name: Jesus Yip IV  Date:2021  : 1995  [x]  Patient  Verified  Payor: [employer]UE [employer]OSS / Plan: Simeon WILBERT Connor [id number] PPO / Product Type: PPO /    In time:820  Out time:859  Total Treatment Time (min): 39  Visit #: 2 of 8    Medicare/BCBS Only   Total Timed Codes (min):  39 1:1 Treatment Time:  39       Treatment Area: Left knee pain [M25.562]    SUBJECTIVE  Pain Level (0-10 scale): 1-2  Any medication changes, allergies to medications, adverse drug reactions, diagnosis change, or new procedure performed?: [x] No    [] Yes (see summary sheet for update)  Subjective functional status/changes:   [] No changes reported  Patient reports knee soreness when standing on his feet for long periods of time. OBJECTIVE    39 min Therapeutic Exercise:  [x] See flow sheet :   Rationale: increase ROM, increase strength, improve coordination, improve balance and increase proprioception to improve the patients ability to perform ADL's pain free and improve standing tolerance. With   [] TE   [] TA   [] neuro   [] other: Patient Education: [x] Review HEP    [] Progressed/Changed HEP based on:   [] positioning   [] body mechanics   [] transfers   [] heat/ice application    [] other:      Other Objective/Functional Measures:  Genu valgus noted with curtsy lunges. Pain Level (0-10 scale) post treatment: 0    ASSESSMENT/Changes in Function: Quad, hamstring, hip abductor and glut fatigue noted after completion of exercises. Patient able to complete all exercises without increased pain or discomfort.     Patient will continue to benefit from skilled PT services to modify and progress therapeutic interventions, address functional mobility deficits, address ROM deficits, address strength deficits, analyze and address soft tissue restrictions, analyze and cue movement patterns, analyze and modify body mechanics/ergonomics, assess and modify postural abnormalities, address imbalance/dizziness and instruct in home and community integration to attain remaining goals. []  See Plan of Care  []  See progress note/recertification  []  See Discharge Summary         Progress towards goals / Updated goals:  1. Patient will improve FOTO score by 9 points in order to demonstrate a significant improvement in function. 2. Patient will report an 85% improvement in left knee symptoms since [facility] in order to increase ease of working. 3. Patient will improve left hip IR/ER strength to 5/5 in order to increase ease of cutting with running.    4. Patient will perform 10 eccentric step downs from 8 inch block without genu valgus and no increase in pain order to increase ease of stair negotiation.     PLAN  []  Upgrade activities as tolerated     [x]  Continue plan of care  []  Update interventions per flow sheet       []  Discharge due to:_  []  Other:_      Austyn Mcdonnell, BERNA 8/26/2021  8:25 AM    Future Appointments   Date Time Provider Angel Valle   8/30/2021  9:00 AM Ellis Maldonado, PTA [facility]   9/2/2021  8:15 AM Regine Ro, PT [facility]   9/7/2021 11:15 AM Samuel Duff, PT [facility]BLJLEC [facility]   9/9/2021  9:45 AM Regine Ro, PT [facility]   9/13/2021  8:15 AM Ellis Maldonado, PTA [facility]   9/16/2021  7:30 AM Chester Moore, PTA [facility]   9/20/2021  8:15 AM Ellis Maldonado, PTA [facility]   9/23/2021  8:15 AM Regine Ro, PT [facility]   9/27/2021  8:15 AM Ellis Maldonado, PTA [facility]   9/30/2021  9:00 AM Regine Ro, PT [facility]   10/4/2021  8:15 AM Regine Ro, PT [facility]   10/7/2021  8:15 AM Regine Ro, PT [facility]

## 2021-08-30 ENCOUNTER — HOSPITAL ENCOUNTER (OUTPATIENT)
Dept: PHYSICAL THERAPY | Age: 26
Discharge: HOME | End: 2021-08-30
Payer: COMMERCIAL

## 2021-08-30 PROCEDURE — 97110 THERAPEUTIC EXERCISES: CPT

## 2021-08-30 PROCEDURE — 97140 MANUAL THERAPY 1/> REGIONS: CPT

## 2021-08-30 NOTE — PROGRESS NOTES
PT DAILY TREATMENT NOTE     Patient Name: Britta Gonzalez IV  Date:2021  : 1995  [x]  Patient  Verified  Payor: BLUE CROSS / Plan: [facility] PPO / Product Type: PPO /    In time: 9:02 Out time: 9:57  Total Treatment Time (min): 55  Visit #: 3 of 8    Medicare/BCBS Only   Total Timed Codes (min): 55 1:1 Treatment Time: 50       Treatment Area: Left knee pain [M25.562]    SUBJECTIVE  Pain Level (0-10 scale): 0/10  Any medication changes, allergies to medications, adverse drug reactions, diagnosis change, or new procedure performed?: [x] No    [] Yes (see summary sheet for update)  Subjective functional status/changes:   [] No changes reported  Pt reports no current pain but continues with soreness. He states he did feel better with the tape on. He reports he never went to therapy after his back injury. He does some exercising like HRs on the stairs at work and some band work. He does planks if he isn't too tired. OBJECTIVE    45 min Therapeutic Exercise:  [x] See flow sheet :   Rationale: increase ROM, increase strength, improve coordination, improve balance and increase proprioception to improve the patients ability to perform ADL's pain free and improve standing tolerance. 10 min Manual Therapy:  [x] See flow sheet : leg lengthening for left upslip; MET for left PI; shotgun technique    TPR left quadratus lumborum    Educated pt on self correction of pelvic alignment due to before strengthening   Rationale: increase ROM, increase strength, improve coordination, improve balance and increase proprioception to improve the patients ability to perform ADL's pain free and improve standing tolerance.            With   [] TE   [] TA   [] neuro   [] other: Patient Education: [x] Review HEP    [] Progressed/Changed HEP based on:   [] positioning   [] body mechanics   [] transfers   [] heat/ice application    [] other:      Other Objective/Functional Measures:  Arch collapse in SLS with captain webster with immediate causing of rotation and genu valgus at the left knee worse than the right  Multiple TPs and TTP through left quadratus lumborum; educated on side lying stretching, foam rolling or tennis ball rolling and supine knee across body stretch to address  Educated to start working on alignment correction before exercises for max glute activation  Side planks and full planks 60 second endurance  In standing pt weight shifts to the left  Discussed maybe a patella brace if pt likes the taping to help with support until strong enough to maintain proper patella tracking with strengthening    Pain Level (0-10 scale) post treatment: 0/10    ASSESSMENT/Changes in Function: Pt continues to make slow, steady progress towards goals and is receptive of therapy recommendations. He continues to present with a left upslip and left PI with hypertonicity in the left quadratus lumborum causing glute inhibition. He has left arch collapse in SLS with genu valgus > right with a previous left foot injury in his medical history. He is progressing his core strength/endurance but continues to have glute weakness and likely arch collapse also contributing to ongoing medial knee pain/irritation. Unsure if pelvic obliquity is from compensation and weakness or an underlying LLD. Will continue to address full left chain strengthening with muscle re-education to maintain foot, knee and hip alignment with SLS progressions for max benefit to return to running and sports that involve jumping.      Patient will continue to benefit from skilled PT services to modify and progress therapeutic interventions, address functional mobility deficits, address ROM deficits, address strength deficits, analyze and address soft tissue restrictions, analyze and cue movement patterns, analyze and modify body mechanics/ergonomics, assess and modify postural abnormalities, address imbalance/dizziness and instruct in home and community integration to attain remaining goals. [x]  See Plan of Care  [x]  See progress note/recertification  []  See Discharge Summary         Progress towards goals / Updated goals:  1. Patient will improve FOTO score by 9 points in order to demonstrate a significant improvement in function. 2. Patient will report an 85% improvement in left knee symptoms since [facility] in order to increase ease of working. 3. Patient will improve left hip IR/ER strength to 5/5 in order to increase ease of cutting with running.    4. Patient will perform 10 eccentric step downs from 8 inch block without genu valgus and no increase in pain order to increase ease of stair negotiation.     PLAN  [x]  Upgrade activities as tolerated     [x]  Continue plan of care  []  Update interventions per flow sheet       []  Discharge due to:_  []  Other:_      JAVIER Valiente 8/30/2021  8:25 AM    Future Appointments   Date Time Provider Angel Valle   8/30/2021  9:00 AM Verner Stamp, PTA [facility]   9/2/2021  8:15 AM Zenovia Cancer, PT [facility]   9/7/2021 11:15 AM Denise Mow, PT [facility]IDTPHT [facility]   9/9/2021  9:45 AM Zenovia Cancer, PT YHLQWVI [facility]   9/13/2021  8:15 AM Verner Stamp, PTA [facility]   9/16/2021  7:30 AM Cristina Bennett, PTA [facility]   9/20/2021  8:15 AM Verner Stamp, PTA [facility]   9/23/2021  8:15 AM Zenovia Cancer, PT [facility]   9/27/2021  8:15 AM Verner Stamp, PTA [facility]   9/30/2021  9:00 AM Zenovia Cancer, PT [facility]   10/4/2021  8:15 AM Zenovia Cancer, PT [facility]   10/7/2021  8:15 AM Zenovia Cancer, PT [facility]

## 2021-09-02 ENCOUNTER — APPOINTMENT (OUTPATIENT)
Dept: PHYSICAL THERAPY | Age: 26
End: 2021-09-02
Payer: COMMERCIAL

## 2021-09-07 ENCOUNTER — HOSPITAL ENCOUNTER (OUTPATIENT)
Dept: PHYSICAL THERAPY | Age: 26
Discharge: HOME | End: 2021-09-07
Payer: COMMERCIAL

## 2021-09-07 PROCEDURE — 97110 THERAPEUTIC EXERCISES: CPT

## 2021-09-07 NOTE — PROGRESS NOTES
PT DAILY TREATMENT NOTE     Patient Name: Gino Quevedo IV  Date:2021  : 1995  [x]  Patient  Verified  Payor: BLUE CROSS / Plan: [facility] PPO / Product Type: PPO /    In time: 11:20  Out time: 12:03  Total Treatment Time (min): 43  Visit #: 4 of 8    Medicare/BCBS Only   Total Timed Codes (min): 43 1:1 Treatment Time: 43       Treatment Area: Left knee pain [M25.562]    SUBJECTIVE  Pain Level (0-10 scale): 1/10  Any medication changes, allergies to medications, adverse drug reactions, diagnosis change, or new procedure performed?: [x] No    [] Yes (see summary sheet for update)  Subjective functional status/changes:   [] No changes reported  Pt reports minor knee pain today. He wants to put some weight back on and get back in the gym. He likes the taping to his knee. He has been trying to get more strength training in. OBJECTIVE    43 min Therapeutic Exercise:  [x] See flow sheet :   Rationale: increase ROM, increase strength, improve coordination, improve balance and increase proprioception to improve the patients ability to perform ADL's pain free and improve standing tolerance. With   [] TE   [] TA   [] neuro   [] other: Patient Education: [x] Review HEP    [] Progressed/Changed HEP based on:   [] positioning   [] body mechanics   [] transfers   [] heat/ice application    [] other:      Other Objective/Functional Measures:  Left upslip again; right shorter than left? Valgus collapse as well as pes planus in SLS  Increased l/s flexion during RDL attempt so backed up to working with dowel lynne on hip hinging bilaterally  Challenged with star plank <15 seconds endurance  Added hip thrusts with PTB   Challenged maintaining short foot with captain morgans  No increased pain during session    Pain Level (0-10 scale) post treatment: 0/10    ASSESSMENT/Changes in Function: Pt progressing with full plank time but challenged with side plank variations for more glute engagement. He continues to have increased valgus and pes planus with single leg exercises. He has improved TKE without pain. He will continue to benefit from hip/core strengthening and stability with progressive standing exercises that incorporate entire left LE for strength/stability, return to sport and decreased pain. Patient will continue to benefit from skilled PT services to modify and progress therapeutic interventions, address functional mobility deficits, address ROM deficits, address strength deficits, analyze and address soft tissue restrictions, analyze and cue movement patterns, analyze and modify body mechanics/ergonomics, assess and modify postural abnormalities, address imbalance/dizziness and instruct in home and community integration to attain remaining goals. [x]  See Plan of Care  [x]  See progress note/recertification  []  See Discharge Summary         Progress towards goals / Updated goals:  1. Patient will improve FOTO score by 9 points in order to demonstrate a significant improvement in function. 2. Patient will report an 85% improvement in left knee symptoms since [facility] in order to increase ease of working. 3. Patient will improve left hip IR/ER strength to 5/5 in order to increase ease of cutting with running.    4. Patient will perform 10 eccentric step downs from 8 inch block without genu valgus and no increase in pain order to increase ease of stair negotiation.     PLAN  [x]  Upgrade activities as tolerated     [x]  Continue plan of care  []  Update interventions per flow sheet       []  Discharge due to:_  []  Other:_      Lisa Valencia PTA 9/7/2021  8:25 AM    Future Appointments   Date Time Provider Angel Valle   9/7/2021 11:15 AM March Muriel, PTA [facility]   9/9/2021  9:45 AM JENNY Bateman [facility]   9/13/2021  8:15 AM March Massgurpreet, PTA [facility]   9/16/2021  7:30 AM Oswaldo Lockwood PTA [facility]   9/20/2021  8:15 AM Phylicia Llamas PTA [facility]   9/23/2021  8:15 AM Dario Cárdenas, PT [facility]   9/27/2021  8:15 AM Sixto Mchugh, PTA [facility]   9/30/2021  9:00 AM Dario Cárdenas, PT [facility]   10/4/2021  8:15 AM Dario Cárdenas, PT SPUBNIT [facility]   10/7/2021  8:15 AM Dario Cárdenas, PT [facility]

## 2021-09-09 ENCOUNTER — HOSPITAL ENCOUNTER (OUTPATIENT)
Dept: PHYSICAL THERAPY | Age: 26
Discharge: HOME | End: 2021-09-09
Payer: COMMERCIAL

## 2021-09-09 PROCEDURE — 97110 THERAPEUTIC EXERCISES: CPT

## 2021-09-09 NOTE — PROGRESS NOTES
PT DAILY TREATMENT NOTE     Patient Name: Denise Borrero  Date:2021  : 1995  [x]  Patient  Verified  Payor: BLUE CROSS / Plan: [facility] PPO / Product Type: PPO /    In time: 9:50  Out time: 10:28  Total Treatment Time (min): 38  Visit #: 5 of 8    Medicare/BCBS Only   Total Timed Codes (min):  38 1:1 Treatment Time:  38       Treatment Area: Left knee pain [M25.562]    SUBJECTIVE  Pain Level (0-10 scale):  3/10  Any medication changes, allergies to medications, adverse drug reactions, diagnosis change, or new procedure performed?: [x] No    [] Yes (see summary sheet for update)  Subjective functional status/changes:   [] No changes reported  Pt. Reports he has been busy with work but is doing ok today. He reports his knee is a little more sore today. OBJECTIVE    38 min Therapeutic Exercise:  [x] See flow sheet :   Rationale: increase ROM and increase strength to improve the patients ability to increase ease of ADLs          With   [x] TE   [] TA   [] neuro   [] other: Patient Education: [x] Review HEP    [] Progressed/Changed HEP based on:   [] positioning   [] body mechanics   [] transfers   [] heat/ice application    [] other:      Other Objective/Functional Measures:   Left hip MMT IR: 5/5 ER: 4+/5  Requires cues to improve terminal knee extension during LAQ with ball squeeze   Genu valgus during squat jumps but was able to correct some with visual feedback from mirror  Challenged with 20# resistance during LAQ machine     Pain Level (0-10 scale) post treatment:  0/10    ASSESSMENT/Changes in Function:  Pt. Is progressing slowly towards goals. He continues to have medial knee pain and patella hypermobility laterally. He continues to have decreased terminal knee extension strength and decreased hip strength. He continues to have genu valgus during functional tasks.      Patient will continue to benefit from skilled PT services to modify and progress therapeutic interventions, address functional mobility deficits, address ROM deficits, address strength deficits, analyze and address soft tissue restrictions, analyze and cue movement patterns, analyze and modify body mechanics/ergonomics and assess and modify postural abnormalities to attain remaining goals. Progress towards goals / Updated goals:  1. Patient will improve FOTO score by 9 points in order to demonstrate a significant improvement in function. 2. Patient will report an 85% improvement in left knee symptoms since [facility] in order to increase ease of working. 3. Patient will improve left hip IR/ER strength to 5/5 in order to increase ease of cutting with running. Progressing: IR: 5/5 ER: 4+/5 (9/9/21)  4. Patient will perform 10 eccentric step downs from 8 inch block without genu valgus and no increase in pain order to increase ease of stair negotiation.     PLAN  []  Upgrade activities as tolerated     [x]  Continue plan of care  []  Update interventions per flow sheet       []  Discharge due to:_  []  Other:_      Marijuan f Co, PT 9/9/2021  7:49 AM    Future Appointments   Date Time Provider Angel Valle   9/9/2021  9:45 AM Barbra Music, PT [facility]   9/13/2021  8:15 AM Ruby Dadebra, PTA [facility]   9/16/2021  7:30 AM Heather Lu, PTA [facility]   9/20/2021  8:15 AM Ruby Dadebra, PTA [facility]   9/23/2021  8:15 AM Barbra Music, PT [facility]   9/27/2021  8:15 AM Ruby Dadebra, PTA [facility]   9/30/2021  9:00 AM Barbra Music, PT [facility]   10/4/2021  8:15 AM Barbra Music, PT [facility]   10/7/2021  8:15 AM Barbra Music, PT [facility]

## 2021-09-13 ENCOUNTER — HOSPITAL ENCOUNTER (OUTPATIENT)
Dept: PHYSICAL THERAPY | Age: 26
Discharge: HOME | End: 2021-09-13
Payer: COMMERCIAL

## 2021-09-13 PROCEDURE — 97110 THERAPEUTIC EXERCISES: CPT

## 2021-09-13 NOTE — PROGRESS NOTES
PT DAILY TREATMENT NOTE     Patient Name: Gino Quevedo IV  Date:2021  : 1995  [x]  Patient  Verified  Payor: BLUE BRAND[place] / Plan: [id number] PPO / Product Type: PPO /    In time: 8:21   Out time: 9:07  Total Treatment Time (min): 46  Visit #: 6 of 8    Medicare/BCBS Only   Total Timed Codes (min): 46 1:1 Treatment Time:  46       Treatment Area: Left knee pain [M25.562]    SUBJECTIVE  Pain Level (0-10 scale): 2/10  Any medication changes, allergies to medications, adverse drug reactions, diagnosis change, or new procedure performed?: [x] No    [] Yes (see summary sheet for update)  Subjective functional status/changes:   [] No changes reported  Pt reports soreness in his knee from doing yard work over the weekend on uneven ground. He was doing some packing yesterday. He is trying to get back to running and basketball without pain. Pt reports moving to [address] [place] [address] so next visit will be his last.    OBJECTIVE    46 min Therapeutic Exercise:  [x] See flow sheet :   Rationale: increase ROM and increase strength to improve the patients ability to increase ease of ADLs          With   [x] TE   [] TA   [] neuro   [] other: Patient Education: [x] Review HEP    [] Progressed/Changed HEP based on:   [] positioning   [] body mechanics   [] transfers   [] heat/ice application    [] other:      Other Objective/Functional Measures:   KT to medially glide patella  Had pt perform self alignment correction; unable to fully resolve left PI independently  Compensatory hip hiking trying to perform glute abduction left>right  Side planks increased to >60\"  Multiple TPs in left quadratus lumborum and left paraspinals L5-S1 region  Added left beth and right hip extensions for alignment  Fatigued with hip thrusts  D/C planning and will provide updated HEP next session    Pain Level (0-10 scale) post treatment: 0/10     ASSESSMENT/Changes in Function:  Pt making slow, steady progress.  He continues to have medial left knee pain with prolonged activities like yard work and has not returned to sports or working out. He struggles with left low back hypertonicity contributing to weakness of  The left glutes from compensatory strategies since his back and left ankle injury in the past. He has pes planus and valgus with functional activities contributing to increased medial knee stress. Will provide updated HEP next session for D/C as pt is moving to [address] [place] [address]. Patient will continue to benefit from skilled PT services to modify and progress therapeutic interventions, address functional mobility deficits, address ROM deficits, address strength deficits, analyze and address soft tissue restrictions, analyze and cue movement patterns, analyze and modify body mechanics/ergonomics and assess and modify postural abnormalities to attain remaining goals. Progress towards goals / Updated goals:  1. Patient will improve FOTO score by 9 points in order to demonstrate a significant improvement in function. 2. Patient will report an 85% improvement in left knee symptoms since [facility] in order to increase ease of working. 3. Patient will improve left hip IR/ER strength to 5/5 in order to increase ease of cutting with running. Progressing: IR: 5/5 ER: 4+/5 (9/9/21)  4. Patient will perform 10 eccentric step downs from 8 inch block without genu valgus and no increase in pain order to increase ease of stair negotiation.     PLAN  [x]  Upgrade activities as tolerated     [x]  Continue plan of care  []  Update interventions per flow sheet       []  Discharge due to:_  []  Other:_      Vidal Hercules PTA 9/13/2021  7:49 AM    Future Appointments   Date Time Provider Angel Valle   9/13/2021  8:15 AM Joi Hicks PTA [facility]   9/16/2021  7:30 AM Shonda Lewis [facility]   9/20/2021  8:15 AM Joi Hicks PTA [facility]   9/23/2021  8:15 AM Gogo Smith PT [facility]   9/27/2021  8:15 AM Arvin Hilton [facility] [address]   9/30/2021  9:00 AM Bronson Ballard, PT [facility] [address]   10/4/2021  8:15 AM Bronson Ballard, PT [facility] [address]   10/7/2021  8:15 AM Bronson Ballard, PT [facility] [address]

## 2021-09-16 ENCOUNTER — HOSPITAL ENCOUNTER (OUTPATIENT)
Dept: PHYSICAL THERAPY | Age: 26
Discharge: HOME | End: 2021-09-16
Payer: COMMERCIAL

## 2021-09-16 PROCEDURE — 97110 THERAPEUTIC EXERCISES: CPT

## 2021-09-16 NOTE — PROGRESS NOTES
PT DISCHARGE DAILY NOTE AND [id number]    Patient name: Mason Godinez Start of Care: 2021   Referral source: Yola Stover MD : 1995                Medical Diagnosis: Left knee pain [M25.562]  Payor: BLUE [place] / Plan: [facility] PPO / Product Type: PPO /  Onset Date:                Treatment Diagnosis: Left knee pain   Prior Hospitalization: see medical history Provider#: [id number]   Medications: Verified on Patient summary List    Comorbidities: None. 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 Active in [employer] and [employer]. Visits from Start of Care: 16    Missed Visits: 0    Reporting Period : 2021 to 2021    Date:2021  : 1995  [x]  Patient  Verified  Payor: Kennedy Peterson / Plan: [facility] [id number] PPO / Product Type: PPO /    In time:737  Out time:810  Total Treatment Time (min): 38  Visit #: 7 of 8    Medicare/BCBS Only   Total Timed Codes (min):  38 1:1 Treatment Time:  38       SUBJECTIVE  Pain Level (0-10 scale): 1-2/10  Any medication changes, allergies to medications, adverse drug reactions, diagnosis change, or new procedure performed?: [x] No    [] Yes (see summary sheet for update)  Subjective functional status/changes:   [] No changes reported  Pt stated that he is doing pretty good today    OBJECTIVE    38 min Therapeutic Exercise:  [x] See flow sheet :   Rationale: increase ROM and increase strength to improve the patients ability to increase ease with ADLs    With   [x] TE   [] TA   [] neuro   [] other: Patient Education: [x] Review HEP    [] Progressed/Changed HEP based on:   [] positioning   [] body mechanics   [] transfers   [] heat/ice application    [] other:      Other Objective/Functional Measures:   FOTO 83     Pain Level (0-10 scale) post treatment: 0/10    Summary of Care:  1.  Patient will improve FOTO score by 9 points in order to demonstrate a significant improvement in function. Progressing. Increased from 75 to 83  2. Patient will report an 85% improvement in left knee symptoms since [facility] in order to increase ease of working. Not met. Reported 65-70% improvement  3. Patient will improve left hip IR/ER strength to 5/5 in order to increase ease of cutting with running. Progressing: IR: 5/5 ER: 4+/5   4. Patient will perform 10 eccentric step downs from 8 inch block without genu valgus and no increase in pain order to increase ease of stair negotiation. Goal met. ASSESSMENT/Changes in Function:   Patient progressed well with therapy. Patient continues with decreased strength in B hips, but is improving. Patient continues with slight henu valgus. Patient reported 65-70% improvement in overall symptoms. FOTO score increased 8 points which indicates increased functional ability. Pt was discharged today and was issued an extensive HEP.     Thank you for this referral!      PLAN  [x]Discontinue therapy: [x]Patient has reached or is progressing toward set goals      []Patient is non-compliant or has abdicated      []Due to lack of appreciable progress towards set goals    Raven Coyne, BERNA 9/16/2021  6:37 AM

## 2021-09-20 ENCOUNTER — APPOINTMENT (OUTPATIENT)
Dept: PHYSICAL THERAPY | Age: 26
End: 2021-09-20
Payer: COMMERCIAL

## 2021-09-23 ENCOUNTER — APPOINTMENT (OUTPATIENT)
Dept: PHYSICAL THERAPY | Age: 26
End: 2021-09-23
Payer: COMMERCIAL

## 2021-09-27 ENCOUNTER — APPOINTMENT (OUTPATIENT)
Dept: PHYSICAL THERAPY | Age: 26
End: 2021-09-27
Payer: COMMERCIAL

## 2021-09-30 ENCOUNTER — APPOINTMENT (OUTPATIENT)
Dept: PHYSICAL THERAPY | Age: 26
End: 2021-09-30
Payer: COMMERCIAL

## 2021-10-04 ENCOUNTER — APPOINTMENT (OUTPATIENT)
Dept: PHYSICAL THERAPY | Age: 26
End: 2021-10-04

## 2021-10-07 ENCOUNTER — APPOINTMENT (OUTPATIENT)
Dept: PHYSICAL THERAPY | Age: 26
End: 2021-10-07